# Patient Record
Sex: MALE | Race: WHITE | NOT HISPANIC OR LATINO | Employment: FULL TIME | URBAN - METROPOLITAN AREA
[De-identification: names, ages, dates, MRNs, and addresses within clinical notes are randomized per-mention and may not be internally consistent; named-entity substitution may affect disease eponyms.]

---

## 2023-07-20 LAB
BASOPHILS (10*3/UL) IN BLOOD BY AUTOMATED COUNT: 0.05 X10E9/L (ref 0–0.1)
BASOPHILS/100 LEUKOCYTES IN BLOOD BY AUTOMATED COUNT: 0.6 % (ref 0–2)
C REACTIVE PROTEIN (MG/L) IN SER/PLAS: 8.72 MG/DL
EOSINOPHILS (10*3/UL) IN BLOOD BY AUTOMATED COUNT: 0.2 X10E9/L (ref 0–0.7)
EOSINOPHILS/100 LEUKOCYTES IN BLOOD BY AUTOMATED COUNT: 2.3 % (ref 0–6)
ERYTHROCYTE DISTRIBUTION WIDTH (RATIO) BY AUTOMATED COUNT: 12.2 % (ref 11.5–14.5)
ERYTHROCYTE MEAN CORPUSCULAR HEMOGLOBIN CONCENTRATION (G/DL) BY AUTOMATED: 33.3 G/DL (ref 32–36)
ERYTHROCYTE MEAN CORPUSCULAR VOLUME (FL) BY AUTOMATED COUNT: 97 FL (ref 80–100)
ERYTHROCYTES (10*6/UL) IN BLOOD BY AUTOMATED COUNT: 4.37 X10E12/L (ref 4.5–5.9)
HEMATOCRIT (%) IN BLOOD BY AUTOMATED COUNT: 42.3 % (ref 41–52)
HEMOGLOBIN (G/DL) IN BLOOD: 14.1 G/DL (ref 13.5–17.5)
IMMATURE GRANULOCYTES/100 LEUKOCYTES IN BLOOD BY AUTOMATED COUNT: 0.6 % (ref 0–0.9)
LEUKOCYTES (10*3/UL) IN BLOOD BY AUTOMATED COUNT: 8.9 X10E9/L (ref 4.4–11.3)
LYMPHOCYTES (10*3/UL) IN BLOOD BY AUTOMATED COUNT: 1.1 X10E9/L (ref 1.2–4.8)
LYMPHOCYTES/100 LEUKOCYTES IN BLOOD BY AUTOMATED COUNT: 12.4 % (ref 13–44)
MONOCYTES (10*3/UL) IN BLOOD BY AUTOMATED COUNT: 0.76 X10E9/L (ref 0.1–1)
MONOCYTES/100 LEUKOCYTES IN BLOOD BY AUTOMATED COUNT: 8.6 % (ref 2–10)
NEUTROPHILS (10*3/UL) IN BLOOD BY AUTOMATED COUNT: 6.7 X10E9/L (ref 1.2–7.7)
NEUTROPHILS/100 LEUKOCYTES IN BLOOD BY AUTOMATED COUNT: 75.5 % (ref 40–80)
PLATELETS (10*3/UL) IN BLOOD AUTOMATED COUNT: 300 X10E9/L (ref 150–450)
SEDIMENTATION RATE, ERYTHROCYTE: 60 MM/H (ref 0–20)
URATE (MG/DL) IN SER/PLAS: 5.6 MG/DL (ref 4–7.5)

## 2023-07-21 LAB
ANTI-NUCLEAR ANTIBODY (ANA): NEGATIVE
RHEUMATOID FACTOR (IU/ML) IN SERUM OR PLASMA: >600 IU/ML (ref 0–15)

## 2023-07-26 ENCOUNTER — OFFICE VISIT (OUTPATIENT)
Dept: PRIMARY CARE | Facility: CLINIC | Age: 53
End: 2023-07-26
Payer: COMMERCIAL

## 2023-07-26 VITALS
HEIGHT: 73 IN | TEMPERATURE: 97.5 F | DIASTOLIC BLOOD PRESSURE: 76 MMHG | OXYGEN SATURATION: 95 % | RESPIRATION RATE: 17 BRPM | SYSTOLIC BLOOD PRESSURE: 112 MMHG | BODY MASS INDEX: 23.03 KG/M2 | HEART RATE: 93 BPM | WEIGHT: 173.8 LBS

## 2023-07-26 DIAGNOSIS — Z11.1 SCREENING EXAMINATION FOR PULMONARY TUBERCULOSIS: ICD-10-CM

## 2023-07-26 DIAGNOSIS — Z11.4 SCREENING FOR HIV (HUMAN IMMUNODEFICIENCY VIRUS): ICD-10-CM

## 2023-07-26 DIAGNOSIS — F17.210 CIGARETTE SMOKER: ICD-10-CM

## 2023-07-26 DIAGNOSIS — Z12.11 SCREEN FOR COLON CANCER: ICD-10-CM

## 2023-07-26 DIAGNOSIS — Z11.59 ENCOUNTER FOR HEPATITIS C SCREENING TEST FOR LOW RISK PATIENT: ICD-10-CM

## 2023-07-26 DIAGNOSIS — Z11.59 NEED FOR HEPATITIS B SCREENING TEST: ICD-10-CM

## 2023-07-26 DIAGNOSIS — Z13.1 SCREENING FOR DIABETES MELLITUS: ICD-10-CM

## 2023-07-26 DIAGNOSIS — Z13.220 SCREENING, LIPID: ICD-10-CM

## 2023-07-26 DIAGNOSIS — M06.4 INFLAMMATORY POLYARTHRITIS (MULTI): Primary | ICD-10-CM

## 2023-07-26 PROCEDURE — 4004F PT TOBACCO SCREEN RCVD TLK: CPT | Performed by: FAMILY MEDICINE

## 2023-07-26 PROCEDURE — 99203 OFFICE O/P NEW LOW 30 MIN: CPT | Performed by: FAMILY MEDICINE

## 2023-07-26 RX ORDER — PREDNISONE 10 MG/1
TABLET ORAL
COMMUNITY
Start: 2023-07-26 | End: 2023-08-10 | Stop reason: ALTCHOICE

## 2023-07-26 ASSESSMENT — ENCOUNTER SYMPTOMS
ARTHRALGIAS: 1
WHEEZING: 0
TROUBLE SWALLOWING: 0
HEADACHES: 0
DYSPHORIC MOOD: 0
SHORTNESS OF BREATH: 0
FATIGUE: 1
OCCASIONAL FEELINGS OF UNSTEADINESS: 1
POLYDIPSIA: 1
DIZZINESS: 0
MYALGIAS: 0
SLEEP DISTURBANCE: 1
BACK PAIN: 1
NERVOUS/ANXIOUS: 0
DIARRHEA: 0
NAUSEA: 0
DIFFICULTY URINATING: 0
CONSTIPATION: 0
PALPITATIONS: 0
LOSS OF SENSATION IN FEET: 0
DEPRESSION: 0
POLYPHAGIA: 0
UNEXPECTED WEIGHT CHANGE: 0
APPETITE CHANGE: 0

## 2023-07-26 ASSESSMENT — PATIENT HEALTH QUESTIONNAIRE - PHQ9
SUM OF ALL RESPONSES TO PHQ9 QUESTIONS 1 AND 2: 0
1. LITTLE INTEREST OR PLEASURE IN DOING THINGS: NOT AT ALL
2. FEELING DOWN, DEPRESSED OR HOPELESS: NOT AT ALL

## 2023-07-26 ASSESSMENT — COLUMBIA-SUICIDE SEVERITY RATING SCALE - C-SSRS
6. HAVE YOU EVER DONE ANYTHING, STARTED TO DO ANYTHING, OR PREPARED TO DO ANYTHING TO END YOUR LIFE?: NO
2. HAVE YOU ACTUALLY HAD ANY THOUGHTS OF KILLING YOURSELF?: NO
1. IN THE PAST MONTH, HAVE YOU WISHED YOU WERE DEAD OR WISHED YOU COULD GO TO SLEEP AND NOT WAKE UP?: NO

## 2023-07-26 NOTE — PROGRESS NOTES
Subjective   Patient ID: Cleve Fowler is a 53 y.o. male who presents for Establish Care.    New patient.     Has had 4 weeks of knee pain, hard to walk and move. Feet and ankles are swollen too. Thumbs are swollen, right greater than left. He saw his ortho Dr. Mora who sent him to Rheumatology. He was seen today and labs were reviewed. He had labs done with elevated CRP, Sed Rate of 60, FR>600. Told him maybe RA but did chest xray and more labs. Started Prednisone today. He has never been on prednisone before.     No other concerns today. Works as  , stands all shift.            Current Outpatient Medications:     predniSONE (Deltasone) 10 mg tablet, Take by mouth., Disp: , Rfl:     Patient Active Problem List   Diagnosis    Inflammatory polyarthritis (CMS/[facility])    Cigarette smoker         Review of Systems   Constitutional:  Positive for fatigue. Negative for appetite change and unexpected weight change.   HENT:  Negative for dental problem, hearing loss and trouble swallowing.    Eyes:  Negative for visual disturbance.   Respiratory:  Negative for shortness of breath and wheezing.    Cardiovascular:  Positive for leg swelling. Negative for chest pain and palpitations.   Gastrointestinal:  Negative for constipation, diarrhea and nausea.   Endocrine: Positive for polydipsia. Negative for cold intolerance, heat intolerance, polyphagia and polyuria.   Genitourinary:  Negative for difficulty urinating.   Musculoskeletal:  Positive for arthralgias and back pain. Negative for myalgias.        Shoulders, elbows, difficulty lifting up arm to schratch head at time.Wrist sores.    Skin:  Negative for rash.   Neurological:  Negative for dizziness and headaches.   Psychiatric/Behavioral:  Positive for sleep disturbance. Negative for dysphoric mood. The patient is not nervous/anxious.         Snoring, wakes self up, wife says he stops breathign.        Objective   /76 (BP Location: Right arm, Patient  "Position: Sitting, BP Cuff Size: Adult)   Pulse 93   Temp 36.4 °C (97.5 °F)   Resp 17   Ht 1.854 m (6' 1\")   Wt 78.8 kg (173 lb 12.8 oz)   SpO2 95%   BMI 22.93 kg/m²     Physical Exam  Vitals reviewed.   Constitutional:       General: He is not in acute distress.     Appearance: He is not ill-appearing.   Neck:      Vascular: No carotid bruit.   Cardiovascular:      Rate and Rhythm: Normal rate and regular rhythm.      Pulses: Normal pulses.      Heart sounds: No murmur heard.  Pulmonary:      Effort: Pulmonary effort is normal.      Breath sounds: Normal breath sounds.   Musculoskeletal:      Left lower leg: No edema.   Skin:     Findings: No rash.   Neurological:      Mental Status: He is alert.   Psychiatric:         Mood and Affect: Mood normal.       Recent Results (from the past 1008 hour(s))   Sedimentation Rate    Collection Time: 07/20/23  3:30 PM   Result Value Ref Range    Sedimentation Rate 60 (H) 0 - 20 mm/h   VAMSI with Reflex to BRADEN    Collection Time: 07/20/23  3:30 PM   Result Value Ref Range    ANTI-NUCLEAR ANTIBODY (VAMSI) NEGATIVE NEGATIVE   C-Reactive Protein    Collection Time: 07/20/23  3:30 PM   Result Value Ref Range    CRP 8.72 (A) mg/dL   Rheumatoid Factor    Collection Time: 07/20/23  3:30 PM   Result Value Ref Range    Rheumatoid Factor >600 (H) 0 - 15 IU/mL   Uric Acid    Collection Time: 07/20/23  3:30 PM   Result Value Ref Range    Uric Acid 5.6 4.0 - 7.5 mg/dL   CBC and Auto Differential    Collection Time: 07/20/23  3:30 PM   Result Value Ref Range    WBC 8.9 4.4 - 11.3 x10E9/L    RBC 4.37 (L) 4.50 - 5.90 x10E12/L    Hemoglobin 14.1 13.5 - 17.5 g/dL    Hematocrit 42.3 41.0 - 52.0 %    MCV 97 80 - 100 fL    MCHC 33.3 32.0 - 36.0 g/dL    Platelets 300 150 - 450 x10E9/L    RDW 12.2 11.5 - 14.5 %    Neutrophils % 75.5 40.0 - 80.0 %    Immature Granulocytes %, Automated 0.6 0.0 - 0.9 %    Lymphocytes % 12.4 13.0 - 44.0 %    Monocytes % 8.6 2.0 - 10.0 %    Eosinophils % 2.3 0.0 - 6.0 % "    Basophils % 0.6 0.0 - 2.0 %    Neutrophils Absolute 6.70 1.20 - 7.70 x10E9/L    Lymphocytes Absolute 1.10 (L) 1.20 - 4.80 x10E9/L    Monocytes Absolute 0.76 0.10 - 1.00 x10E9/L    Eosinophils Absolute 0.20 0.00 - 0.70 x10E9/L    Basophils Absolute 0.05 0.00 - 0.10 x10E9/L           Assessment/Plan   Problem List Items Addressed This Visit       Inflammatory polyarthritis (CMS/HCC) - Primary     Discussed lab results, He is likely to go on a DMARD. Will get labs done. Given short term prednisone burst while await rheumatology.          Relevant Orders    Comprehensive Metabolic Panel    Cigarette smoker     Discussed quitting. Offered LDCT and he accepted.          Relevant Orders    CT lung screening low dose     Other Visit Diagnoses       Encounter for hepatitis C screening test for low risk patient        Relevant Orders    Hepatitis C Antibody    Screening for HIV (human immunodeficiency virus)        Relevant Orders    HIV 1/2 Antigen/Antibody Screen with Reflex to Confirmation    Screening, lipid        Relevant Orders    Lipid Panel    Screening for diabetes mellitus        Relevant Orders    Comprehensive Metabolic Panel    Hemoglobin A1C    Screening examination for pulmonary tuberculosis        Relevant Orders    T-Spot TB    Need for hepatitis B screening test        Relevant Orders    Hepatitis B Surface Antibody    Hepatitis B Surface Antigen    Hepatitis B core antibody, total    Screen for colon cancer        Relevant Orders    Colonoscopy              Assessment, plans, tests, and follow up discussed with patient and patient verbalized understanding. Cleve was given an opportunity to ask questions and  any concerns were addressed including but not limited to medication, follow up, indications to call, side effects and use of Prednisone. .

## 2023-07-28 PROBLEM — M06.4 INFLAMMATORY POLYARTHRITIS (MULTI): Status: ACTIVE | Noted: 2023-07-28

## 2023-07-28 PROBLEM — F17.210 CIGARETTE SMOKER: Status: ACTIVE | Noted: 2023-07-28

## 2023-07-28 NOTE — ASSESSMENT & PLAN NOTE
Discussed lab results, He is likely to go on a DMARD. Will get labs done. Given short term prednisone burst while await rheumatology.

## 2023-08-10 ENCOUNTER — OFFICE VISIT (OUTPATIENT)
Dept: PRIMARY CARE | Facility: CLINIC | Age: 53
End: 2023-08-10
Payer: COMMERCIAL

## 2023-08-10 ENCOUNTER — LAB (OUTPATIENT)
Dept: LAB | Facility: LAB | Age: 53
End: 2023-08-10
Payer: COMMERCIAL

## 2023-08-10 VITALS
OXYGEN SATURATION: 93 % | WEIGHT: 181.4 LBS | SYSTOLIC BLOOD PRESSURE: 153 MMHG | BODY MASS INDEX: 24.04 KG/M2 | TEMPERATURE: 97.8 F | HEART RATE: 98 BPM | DIASTOLIC BLOOD PRESSURE: 95 MMHG | HEIGHT: 73 IN

## 2023-08-10 DIAGNOSIS — M79.89 SWELLING OF LOWER EXTREMITY: ICD-10-CM

## 2023-08-10 DIAGNOSIS — F17.210 CIGARETTE SMOKER: ICD-10-CM

## 2023-08-10 DIAGNOSIS — Z11.59 ENCOUNTER FOR HEPATITIS C SCREENING TEST FOR LOW RISK PATIENT: ICD-10-CM

## 2023-08-10 DIAGNOSIS — Z00.00 WELL ADULT EXAM: Primary | ICD-10-CM

## 2023-08-10 DIAGNOSIS — Z11.59 NEED FOR HEPATITIS B SCREENING TEST: ICD-10-CM

## 2023-08-10 DIAGNOSIS — Z11.1 SCREENING EXAMINATION FOR PULMONARY TUBERCULOSIS: ICD-10-CM

## 2023-08-10 DIAGNOSIS — M06.4 INFLAMMATORY POLYARTHRITIS (MULTI): ICD-10-CM

## 2023-08-10 DIAGNOSIS — Z13.1 SCREENING FOR DIABETES MELLITUS: ICD-10-CM

## 2023-08-10 DIAGNOSIS — Z13.220 SCREENING, LIPID: ICD-10-CM

## 2023-08-10 DIAGNOSIS — Z11.4 SCREENING FOR HIV (HUMAN IMMUNODEFICIENCY VIRUS): ICD-10-CM

## 2023-08-10 LAB
ALANINE AMINOTRANSFERASE (SGPT) (U/L) IN SER/PLAS: 19 U/L (ref 10–52)
ALBUMIN (G/DL) IN SER/PLAS: 3.5 G/DL (ref 3.4–5)
ALKALINE PHOSPHATASE (U/L) IN SER/PLAS: 60 U/L (ref 33–120)
ANION GAP IN SER/PLAS: 9 MMOL/L (ref 10–20)
ASPARTATE AMINOTRANSFERASE (SGOT) (U/L) IN SER/PLAS: 15 U/L (ref 9–39)
BILIRUBIN TOTAL (MG/DL) IN SER/PLAS: 0.5 MG/DL (ref 0–1.2)
CALCIUM (MG/DL) IN SER/PLAS: 9.3 MG/DL (ref 8.6–10.3)
CARBON DIOXIDE, TOTAL (MMOL/L) IN SER/PLAS: 30 MMOL/L (ref 21–32)
CHLORIDE (MMOL/L) IN SER/PLAS: 104 MMOL/L (ref 98–107)
CHOLESTEROL (MG/DL) IN SER/PLAS: 207 MG/DL (ref 0–199)
CHOLESTEROL IN HDL (MG/DL) IN SER/PLAS: 96.1 MG/DL
CHOLESTEROL/HDL RATIO: 2.2
CREATININE (MG/DL) IN SER/PLAS: 0.74 MG/DL (ref 0.5–1.3)
ESTIMATED AVERAGE GLUCOSE FOR HBA1C: 123 MG/DL
GFR MALE: >90 ML/MIN/1.73M2
GLUCOSE (MG/DL) IN SER/PLAS: 83 MG/DL (ref 74–99)
HEMOGLOBIN A1C/HEMOGLOBIN TOTAL IN BLOOD: 5.9 %
HEPATITIS B VIRUS CORE AB (PRESENCE) IN SER/PLAS BY IMM: NONREACTIVE
HEPATITIS B VIRUS SURFACE AB (MIU/ML) IN SERUM: <3.1 MIU/ML
HEPATITIS B VIRUS SURFACE AG PRESENCE IN SERUM: NONREACTIVE
HEPATITIS C VIRUS AB PRESENCE IN SERUM: NONREACTIVE
HIV 1/ 2 AG/AB SCREEN: NONREACTIVE
LDL: 99 MG/DL (ref 0–99)
POTASSIUM (MMOL/L) IN SER/PLAS: 4.3 MMOL/L (ref 3.5–5.3)
PROTEIN TOTAL: 6.4 G/DL (ref 6.4–8.2)
SODIUM (MMOL/L) IN SER/PLAS: 139 MMOL/L (ref 136–145)
TRIGLYCERIDE (MG/DL) IN SER/PLAS: 58 MG/DL (ref 0–149)
UREA NITROGEN (MG/DL) IN SER/PLAS: 12 MG/DL (ref 6–23)
VLDL: 12 MG/DL (ref 0–40)

## 2023-08-10 PROCEDURE — 87340 HEPATITIS B SURFACE AG IA: CPT

## 2023-08-10 PROCEDURE — 86803 HEPATITIS C AB TEST: CPT

## 2023-08-10 PROCEDURE — 86704 HEP B CORE ANTIBODY TOTAL: CPT

## 2023-08-10 PROCEDURE — 80061 LIPID PANEL: CPT

## 2023-08-10 PROCEDURE — 87389 HIV-1 AG W/HIV-1&-2 AB AG IA: CPT

## 2023-08-10 PROCEDURE — 36415 COLL VENOUS BLD VENIPUNCTURE: CPT

## 2023-08-10 PROCEDURE — 80053 COMPREHEN METABOLIC PANEL: CPT

## 2023-08-10 PROCEDURE — 4004F PT TOBACCO SCREEN RCVD TLK: CPT | Performed by: FAMILY MEDICINE

## 2023-08-10 PROCEDURE — 86706 HEP B SURFACE ANTIBODY: CPT

## 2023-08-10 PROCEDURE — 86481 TB AG RESPONSE T-CELL SUSP: CPT

## 2023-08-10 PROCEDURE — 99396 PREV VISIT EST AGE 40-64: CPT | Performed by: FAMILY MEDICINE

## 2023-08-10 PROCEDURE — 83036 HEMOGLOBIN GLYCOSYLATED A1C: CPT

## 2023-08-10 RX ORDER — HYDROCHLOROTHIAZIDE 25 MG/1
25 TABLET ORAL DAILY PRN
Qty: 30 TABLET | Refills: 0 | Status: SHIPPED | OUTPATIENT
Start: 2023-08-10 | End: 2023-08-30 | Stop reason: SDUPTHER

## 2023-08-10 RX ORDER — ETANERCEPT 50 MG/ML
50 SOLUTION SUBCUTANEOUS WEEKLY
Qty: 4 ML | Refills: 5 | COMMUNITY
Start: 2023-08-01 | End: 2024-01-28

## 2023-08-10 ASSESSMENT — ENCOUNTER SYMPTOMS
DECREASED CONCENTRATION: 0
DEPRESSION: 0
SEIZURES: 0
VOMITING: 0
ARTHRALGIAS: 0
DIFFICULTY URINATING: 0
DYSPHORIC MOOD: 0
DIARRHEA: 0
NAUSEA: 0
NERVOUS/ANXIOUS: 0
DIZZINESS: 0
FREQUENCY: 0
SHORTNESS OF BREATH: 0
COUGH: 0
ABDOMINAL PAIN: 0
ACTIVITY CHANGE: 0
LOSS OF SENSATION IN FEET: 0
POLYDIPSIA: 0
FATIGUE: 0
POLYPHAGIA: 0
HEADACHES: 0
JOINT SWELLING: 0
TROUBLE SWALLOWING: 0
CONSTIPATION: 0
APPETITE CHANGE: 0
UNEXPECTED WEIGHT CHANGE: 0
OCCASIONAL FEELINGS OF UNSTEADINESS: 0
PALPITATIONS: 0
WHEEZING: 0
BLOOD IN STOOL: 0

## 2023-08-10 NOTE — ASSESSMENT & PLAN NOTE
Reviewed Rheum notes. He is to have HIV, TB and Hep C drawn. Ordered last visit. He will follow up with them next month.

## 2023-08-13 LAB
NIL(NEG) CONTROL SPOT COUNT: NORMAL
PANEL A SPOT COUNT: 0
PANEL B SPOT COUNT: 0
POS CONTROL SPOT COUNT: NORMAL
T-SPOT. TB INTERPRETATION: NEGATIVE

## 2023-08-14 ENCOUNTER — TELEPHONE (OUTPATIENT)
Dept: PRIMARY CARE | Facility: CLINIC | Age: 53
End: 2023-08-14
Payer: COMMERCIAL

## 2023-08-14 NOTE — TELEPHONE ENCOUNTER
Please notify patint TB test is negative (TSPot) and see if needs it sent to his rheumatologist or dermatologist, whomever is treating his polyarthtirits

## 2023-08-16 ENCOUNTER — TELEPHONE (OUTPATIENT)
Dept: PRIMARY CARE | Facility: CLINIC | Age: 53
End: 2023-08-16
Payer: COMMERCIAL

## 2023-08-16 NOTE — TELEPHONE ENCOUNTER
Patient called in and wanted to know if you wanted him to start the enbrel sureclick now, or if he should wait until he is officially done with his steroids.

## 2023-08-30 ENCOUNTER — OFFICE VISIT (OUTPATIENT)
Dept: PRIMARY CARE | Facility: CLINIC | Age: 53
End: 2023-08-30
Payer: COMMERCIAL

## 2023-08-30 VITALS
HEIGHT: 73 IN | BODY MASS INDEX: 24.01 KG/M2 | OXYGEN SATURATION: 91 % | DIASTOLIC BLOOD PRESSURE: 76 MMHG | TEMPERATURE: 97.6 F | RESPIRATION RATE: 17 BRPM | WEIGHT: 181.2 LBS | HEART RATE: 90 BPM | SYSTOLIC BLOOD PRESSURE: 115 MMHG

## 2023-08-30 DIAGNOSIS — G47.30 SLEEP APNEA, UNSPECIFIED TYPE: ICD-10-CM

## 2023-08-30 DIAGNOSIS — M06.4 INFLAMMATORY POLYARTHRITIS (MULTI): ICD-10-CM

## 2023-08-30 DIAGNOSIS — Z23 ENCOUNTER FOR IMMUNIZATION: ICD-10-CM

## 2023-08-30 DIAGNOSIS — F17.210 CIGARETTE SMOKER: ICD-10-CM

## 2023-08-30 DIAGNOSIS — M79.89 SWELLING OF LOWER EXTREMITY: Primary | ICD-10-CM

## 2023-08-30 DIAGNOSIS — R40.0 DAYTIME SOMNOLENCE: ICD-10-CM

## 2023-08-30 PROCEDURE — 4004F PT TOBACCO SCREEN RCVD TLK: CPT | Performed by: FAMILY MEDICINE

## 2023-08-30 PROCEDURE — 90677 PCV20 VACCINE IM: CPT | Performed by: FAMILY MEDICINE

## 2023-08-30 PROCEDURE — 90471 IMMUNIZATION ADMIN: CPT | Performed by: FAMILY MEDICINE

## 2023-08-30 PROCEDURE — 99214 OFFICE O/P EST MOD 30 MIN: CPT | Performed by: FAMILY MEDICINE

## 2023-08-30 RX ORDER — PREDNISONE 10 MG/1
TABLET ORAL
COMMUNITY
Start: 2023-08-11 | End: 2023-12-07 | Stop reason: DRUGHIGH

## 2023-08-30 RX ORDER — HYDROCHLOROTHIAZIDE 25 MG/1
25 TABLET ORAL DAILY PRN
Qty: 90 TABLET | Refills: 1 | Status: SHIPPED | OUTPATIENT
Start: 2023-08-30 | End: 2024-01-17 | Stop reason: ALTCHOICE

## 2023-08-30 RX ORDER — TRAZODONE HYDROCHLORIDE 50 MG/1
50 TABLET ORAL NIGHTLY
COMMUNITY
Start: 2023-08-25

## 2023-08-30 ASSESSMENT — ENCOUNTER SYMPTOMS
CONFUSION: 0
UNEXPECTED WEIGHT CHANGE: 0
DEPRESSION: 0
LOSS OF SENSATION IN FEET: 0
SHORTNESS OF BREATH: 0
HEADACHES: 0
COUGH: 0
PALPITATIONS: 0
JOINT SWELLING: 0
OCCASIONAL FEELINGS OF UNSTEADINESS: 0
WHEEZING: 0

## 2023-08-30 ASSESSMENT — COLUMBIA-SUICIDE SEVERITY RATING SCALE - C-SSRS
6. HAVE YOU EVER DONE ANYTHING, STARTED TO DO ANYTHING, OR PREPARED TO DO ANYTHING TO END YOUR LIFE?: NO
1. IN THE PAST MONTH, HAVE YOU WISHED YOU WERE DEAD OR WISHED YOU COULD GO TO SLEEP AND NOT WAKE UP?: NO
2. HAVE YOU ACTUALLY HAD ANY THOUGHTS OF KILLING YOURSELF?: NO

## 2023-08-30 ASSESSMENT — PATIENT HEALTH QUESTIONNAIRE - PHQ9
2. FEELING DOWN, DEPRESSED OR HOPELESS: NOT AT ALL
1. LITTLE INTEREST OR PLEASURE IN DOING THINGS: NOT AT ALL
SUM OF ALL RESPONSES TO PHQ9 QUESTIONS 1 AND 2: 0

## 2023-08-30 NOTE — PATIENT INSTRUCTIONS
You had Prevnar 20 shot today, this is for pneumonia    Need Flu and COVID shots in October or November.   Due for Tdap and Shingrix and will get them done over course of regular visits.     Follow up in 3 months with labs first.

## 2023-08-30 NOTE — PROGRESS NOTES
Subjective   Patient ID: Cleve Fowler is a 53 y.o. male who presents for Follow-up.    Here for follow up.     Seen on 8/10/2023.     Swelling - given hydrochlorothiazide. He is doing much better on it. Feet still get sore after walking or standing too long. Has orthopedic insoles. Feet still get sore with standing for very long. He has used the same insoles for 5-6 yrs.     BP was elevated last visit. On Prednisone, tapered down to 2 pills daily. On Enbrel and he has not been able to get it. He got notice from insurance that not covered. Has not contacted insurance.     Smoking is slowing down. Trying to quit.     Not sleeping well. Is on prednisone but preceded this. Per wife, stops breathing in the night. Needs referral for sleep study.     Due for Tdap, pneumonia and shingrix. Willing to start with pneumonia vaccines.            Current Outpatient Medications:     hydroCHLOROthiazide (HYDRODiuril) 25 mg tablet, Take 1 tablet (25 mg) by mouth once daily as needed (swelling legs)., Disp: 30 tablet, Rfl: 0    predniSONE (Deltasone) 10 mg tablet, PLEASE SEE ATTACHED FOR DETAILED DIRECTIONS, Disp: , Rfl:     traZODone (Desyrel) 50 mg tablet, Take 1 tablet (50 mg) by mouth once daily at bedtime., Disp: , Rfl:     etanercept (EnbreL SureClick) 50 mg/mL (1 mL) pen injector pen injection, Inject 1 mL (50 mg) under the skin once a week., Disp: 4 mL, Rfl: 5    Patient Active Problem List   Diagnosis    Inflammatory polyarthritis (CMS/HCC)    Cigarette smoker    Swelling of lower extremity    Sleep apnea    Daytime somnolence         Review of Systems   Constitutional:  Negative for unexpected weight change.   Respiratory:  Negative for cough, shortness of breath and wheezing.    Cardiovascular:  Negative for chest pain, palpitations and leg swelling.   Musculoskeletal:  Negative for joint swelling.   Skin:  Negative for rash.   Neurological:  Negative for headaches.   Psychiatric/Behavioral:  Negative for confusion.   "      Objective   /76 (BP Location: Left arm, Patient Position: Sitting, BP Cuff Size: Adult)   Pulse 90   Temp 36.4 °C (97.6 °F)   Resp 17   Ht 1.842 m (6' 0.5\")   Wt 82.2 kg (181 lb 3.2 oz)   SpO2 91%   BMI 24.24 kg/m²     Physical Exam  Vitals reviewed.   Constitutional:       Appearance: Normal appearance.   Pulmonary:      Effort: Pulmonary effort is normal.   Musculoskeletal:      Comments: Trace swelling both ankles.    Neurological:      Mental Status: He is alert.   Psychiatric:         Mood and Affect: Mood normal.         Behavior: Behavior normal.       Recent Results (from the past 1008 hour(s))   Sedimentation Rate    Collection Time: 07/20/23  3:30 PM   Result Value Ref Range    Sedimentation Rate 60 (H) 0 - 20 mm/h   VAMSI with Reflex to BRADEN    Collection Time: 07/20/23  3:30 PM   Result Value Ref Range    ANTI-NUCLEAR ANTIBODY (VAMSI) NEGATIVE NEGATIVE   C-Reactive Protein    Collection Time: 07/20/23  3:30 PM   Result Value Ref Range    CRP 8.72 (A) mg/dL   Rheumatoid Factor    Collection Time: 07/20/23  3:30 PM   Result Value Ref Range    Rheumatoid Factor >600 (H) 0 - 15 IU/mL   Uric Acid    Collection Time: 07/20/23  3:30 PM   Result Value Ref Range    Uric Acid 5.6 4.0 - 7.5 mg/dL   CBC and Auto Differential    Collection Time: 07/20/23  3:30 PM   Result Value Ref Range    WBC 8.9 4.4 - 11.3 x10E9/L    RBC 4.37 (L) 4.50 - 5.90 x10E12/L    Hemoglobin 14.1 13.5 - 17.5 g/dL    Hematocrit 42.3 41.0 - 52.0 %    MCV 97 80 - 100 fL    MCHC 33.3 32.0 - 36.0 g/dL    Platelets 300 150 - 450 x10E9/L    RDW 12.2 11.5 - 14.5 %    Neutrophils % 75.5 40.0 - 80.0 %    Immature Granulocytes %, Automated 0.6 0.0 - 0.9 %    Lymphocytes % 12.4 13.0 - 44.0 %    Monocytes % 8.6 2.0 - 10.0 %    Eosinophils % 2.3 0.0 - 6.0 %    Basophils % 0.6 0.0 - 2.0 %    Neutrophils Absolute 6.70 1.20 - 7.70 x10E9/L    Lymphocytes Absolute 1.10 (L) 1.20 - 4.80 x10E9/L    Monocytes Absolute 0.76 0.10 - 1.00 x10E9/L    " Eosinophils Absolute 0.20 0.00 - 0.70 x10E9/L    Basophils Absolute 0.05 0.00 - 0.10 x10E9/L   Comprehensive Metabolic Panel    Collection Time: 08/10/23 10:53 AM   Result Value Ref Range    Glucose 83 74 - 99 mg/dL    Sodium 139 136 - 145 mmol/L    Potassium 4.3 3.5 - 5.3 mmol/L    Chloride 104 98 - 107 mmol/L    Bicarbonate 30 21 - 32 mmol/L    Anion Gap 9 (L) 10 - 20 mmol/L    Urea Nitrogen 12 6 - 23 mg/dL    Creatinine 0.74 0.50 - 1.30 mg/dL    GFR MALE >90 >90 mL/min/1.73m2    Calcium 9.3 8.6 - 10.3 mg/dL    Albumin 3.5 3.4 - 5.0 g/dL    Alkaline Phosphatase 60 33 - 120 U/L    Total Protein 6.4 6.4 - 8.2 g/dL    AST 15 9 - 39 U/L    Total Bilirubin 0.5 0.0 - 1.2 mg/dL    ALT (SGPT) 19 10 - 52 U/L   Hemoglobin A1C    Collection Time: 08/10/23 10:53 AM   Result Value Ref Range    Hemoglobin A1C 5.9 (A) %    Estimated Average Glucose 123 MG/DL   Hepatitis C Antibody    Collection Time: 08/10/23 10:53 AM   Result Value Ref Range    Hepatitis C Ab NONREACTIVE NONREACTIVE   Lipid Panel    Collection Time: 08/10/23 10:53 AM   Result Value Ref Range    Cholesterol 207 (H) 0 - 199 mg/dL    HDL 96.1 mg/dL    Cholesterol/HDL Ratio 2.2     LDL 99 0 - 99 mg/dL    VLDL 12 0 - 40 mg/dL    Triglycerides 58 0 - 149 mg/dL   Hepatitis B Surface Antibody    Collection Time: 08/10/23 10:53 AM   Result Value Ref Range    Hepatitis B Surface Ab <3.1 <10 mIU/mL   Hepatitis B Surface Antigen    Collection Time: 08/10/23 10:53 AM   Result Value Ref Range    Hepatitis B Surface Ag NONREACTIVE NONREACTIVE   Hepatitis B core antibody, total    Collection Time: 08/10/23 10:53 AM   Result Value Ref Range    Hepatitis B Core Total Ab NONREACTIVE NONREACTIVE   T-Spot TB    Collection Time: 08/10/23 10:53 AM   Result Value Ref Range    T-SPOT. TB Interpretation Negative Normal Value: Negative    Panel A Spot Count 0     Panel B Spot Count 0     NIL(NEG) Control Spot Count Passed     POS Control Spot Count Passed    HIV 1/2 Antigen/Antibody  Screen with Reflex to Confirmation    Collection Time: 08/10/23 10:53 AM   Result Value Ref Range    HIV 1 and 2 Screen NONREACTIVE NONREACTIVE         Assessment/Plan   Problem List Items Addressed This Visit       Inflammatory polyarthritis (CMS/HCC)     He is awaiting DMARD approval, tapering off Prednisone.          Cigarette smoker     Cut back a lot, continuing to work toward cessation         Swelling of lower extremity - Primary     Improving a lot. Add compression socks. Follow up in 3 months, labs first. Continue hydrochlorothiazide.          Relevant Orders    Basic Metabolic Panel    Sleep apnea     Observed by wife. Sleep study ordered.          Relevant Orders    In-Center Sleep Study (Non-Sleep Provider Only)    Daytime somnolence     Suspecct LUIS. Sleep study. Follow up in couple months.          Relevant Orders    In-Center Sleep Study (Non-Sleep Provider Only)     Other Visit Diagnoses       Encounter for immunization        Relevant Orders    Pneumococcal conjugate vaccine, 20-valent, adult (PREVNAR 20)              Assessment, plans, tests, and follow up discussed with patient and patient verbalized understanding. Cleve was given an opportunity to ask questions and  any concerns were addressed including but not limited to medications, testing follow up. .

## 2023-08-30 NOTE — ASSESSMENT & PLAN NOTE
Improving a lot. Add compression socks. Follow up in 3 months, labs first. Continue hydrochlorothiazide.

## 2023-09-11 ENCOUNTER — APPOINTMENT (OUTPATIENT)
Dept: PRIMARY CARE | Facility: CLINIC | Age: 53
End: 2023-09-11
Payer: COMMERCIAL

## 2023-09-20 ENCOUNTER — HOSPITAL ENCOUNTER (OUTPATIENT)
Dept: DATA CONVERSION | Facility: HOSPITAL | Age: 53
End: 2023-09-20
Attending: INTERNAL MEDICINE | Admitting: INTERNAL MEDICINE
Payer: COMMERCIAL

## 2023-09-20 DIAGNOSIS — Z12.11 ENCOUNTER FOR SCREENING FOR MALIGNANT NEOPLASM OF COLON: ICD-10-CM

## 2023-09-20 DIAGNOSIS — F17.200 NICOTINE DEPENDENCE, UNSPECIFIED, UNCOMPLICATED: ICD-10-CM

## 2023-09-20 DIAGNOSIS — K64.9 UNSPECIFIED HEMORRHOIDS: ICD-10-CM

## 2023-09-20 DIAGNOSIS — I10 ESSENTIAL (PRIMARY) HYPERTENSION: ICD-10-CM

## 2023-09-20 DIAGNOSIS — D12.3 BENIGN NEOPLASM OF TRANSVERSE COLON: ICD-10-CM

## 2023-09-26 LAB
COMPLETE PATHOLOGY REPORT: NORMAL
CONVERTED CLINICAL DIAGNOSIS-HISTORY: NORMAL
CONVERTED FINAL DIAGNOSIS: NORMAL
CONVERTED FINAL REPORT PDF LINK TO COPY AND PASTE: NORMAL
CONVERTED GROSS DESCRIPTION: NORMAL

## 2023-09-29 VITALS — BODY MASS INDEX: 25.08 KG/M2 | WEIGHT: 185.19 LBS | HEIGHT: 72 IN

## 2023-10-21 ENCOUNTER — CLINICAL SUPPORT (OUTPATIENT)
Dept: SLEEP MEDICINE | Facility: CLINIC | Age: 53
End: 2023-10-21
Payer: COMMERCIAL

## 2023-10-21 VITALS
DIASTOLIC BLOOD PRESSURE: 76 MMHG | WEIGHT: 181.22 LBS | BODY MASS INDEX: 24.55 KG/M2 | SYSTOLIC BLOOD PRESSURE: 115 MMHG | HEIGHT: 72 IN

## 2023-10-21 DIAGNOSIS — G47.30 SLEEP APNEA, UNSPECIFIED TYPE: ICD-10-CM

## 2023-10-21 DIAGNOSIS — G47.33 OBSTRUCTIVE SLEEP APNEA (ADULT) (PEDIATRIC): ICD-10-CM

## 2023-10-21 DIAGNOSIS — R40.0 DAYTIME SOMNOLENCE: ICD-10-CM

## 2023-10-21 PROCEDURE — 95811 POLYSOM 6/>YRS CPAP 4/> PARM: CPT | Performed by: STUDENT IN AN ORGANIZED HEALTH CARE EDUCATION/TRAINING PROGRAM

## 2023-10-21 ASSESSMENT — SLEEP AND FATIGUE QUESTIONNAIRES
HOW LIKELY ARE YOU TO NOD OFF OR FALL ASLEEP WHILE SITTING AND TALKING TO SOMEONE: WOULD NEVER DOZE
SITING INACTIVE IN A PUBLIC PLACE LIKE A CLASS ROOM OR A MOVIE THEATER: MODERATE CHANCE OF DOZING
HOW LIKELY ARE YOU TO NOD OFF OR FALL ASLEEP WHILE LYING DOWN TO REST IN THE AFTERNOON WHEN CIRCUMSTANCES PERMIT: MODERATE CHANCE OF DOZING
ESS-CHAD TOTAL SCORE: 10
HOW LIKELY ARE YOU TO NOD OFF OR FALL ASLEEP IN A CAR, WHILE STOPPED FOR A FEW MINUTES IN TRAFFIC: WOULD NEVER DOZE
HOW LIKELY ARE YOU TO NOD OFF OR FALL ASLEEP WHILE SITTING AND READING: MODERATE CHANCE OF DOZING
HOW LIKELY ARE YOU TO NOD OFF OR FALL ASLEEP WHEN YOU ARE A PASSENGER IN A CAR FOR AN HOUR WITHOUT A BREAK: SLIGHT CHANCE OF DOZING
HOW LIKELY ARE YOU TO NOD OFF OR FALL ASLEEP WHILE WATCHING TV: MODERATE CHANCE OF DOZING
HOW LIKELY ARE YOU TO NOD OFF OR FALL ASLEEP WHILE SITTING QUIETLY AFTER LUNCH WITHOUT ALCOHOL: SLIGHT CHANCE OF DOZING

## 2023-10-22 NOTE — PROGRESS NOTES
[facility] TECH NOTE:     Patient: Cleve Fowler   MRN//AGE: [id number]  1970  53 y.o.   Technologist: Francine Kelly   Room: 3   Service Date: 10/21/2023        [facility]     TECHNOLOGIST SLEEP STUDY PROCEDURE NOTE:   This sleep study is being conducted according to the policies and procedures outlined by the AAS accreditation standards.  The sleep study procedure and processes involved during this appointment was explained to the patient/patient’s family, questions were answered. The patient/family verbalized understanding.      The patient is a 53 y.o. year old male scheduled for a Diagnostic PSG with montage of:  Standard . he arrived for his appointment.      The study that was ultimately completed was a Diagnostic PSG Split night with montage of:  Standard .    The full study Was completed.  Patient questionnaires completed?: yes     Consents signed? yes    Initial Fall Risk Screening:     Cleve has not fallen in the last 6 months. his did not result in injury. Cleve does not have a fear of falling. He does not need assistance with sitting, standing, or walking. he does not need assistance walking in his home. he does not need assistance in an unfamiliar setting. The patient is notusing an assistive device.     Brief Study observations: Frequent respiratory events occurred.  Loud snoring was heard.  The patient met the AHI criteria of 30, so CPAP was applied at 00:43am.  Pressures ranged from 4 cm to 12 cm. The patient tolerated the CPAP therapy well.  No PLM's.  No significant ECG arrhythmias were seen.    Deviation to order/protocol and reason: The pt. met the AHI criteria of 30 so a split night study was performed.      If PAP, which was preferred mask/pressure/mode: Res Med Airfit N-20 Large      Other:None    After the procedure, the patient/family was informed to ensure followup with ordering clinician for testing results.      Technologist: Francine  Robin, [email or web address]

## 2023-11-10 ENCOUNTER — TELEPHONE (OUTPATIENT)
Dept: PRIMARY CARE | Facility: CLINIC | Age: 53
End: 2023-11-10
Payer: COMMERCIAL

## 2023-11-10 DIAGNOSIS — G47.33 OBSTRUCTIVE SLEEP APNEA: Primary | ICD-10-CM

## 2023-11-10 NOTE — TELEPHONE ENCOUNTER
Sleep study from titration recommended AutoCPAP 4-14 cm H20 with Heated Humidification, and EPR/Flex of 3. Recommended Airfit N20 mask large. I have put in order and printed it. Please find out what supplier he will use and fax it over.

## 2023-12-06 ENCOUNTER — APPOINTMENT (OUTPATIENT)
Dept: PRIMARY CARE | Facility: CLINIC | Age: 53
End: 2023-12-06
Payer: COMMERCIAL

## 2023-12-07 ENCOUNTER — OFFICE VISIT (OUTPATIENT)
Dept: PRIMARY CARE | Facility: CLINIC | Age: 53
End: 2023-12-07
Payer: COMMERCIAL

## 2023-12-07 VITALS
SYSTOLIC BLOOD PRESSURE: 142 MMHG | OXYGEN SATURATION: 94 % | DIASTOLIC BLOOD PRESSURE: 92 MMHG | WEIGHT: 193.4 LBS | HEIGHT: 73 IN | BODY MASS INDEX: 25.63 KG/M2 | TEMPERATURE: 96.6 F | HEART RATE: 98 BPM

## 2023-12-07 DIAGNOSIS — Z79.52 LONG-TERM CORTICOSTEROID USE: ICD-10-CM

## 2023-12-07 DIAGNOSIS — M06.4 INFLAMMATORY POLYARTHRITIS (MULTI): Primary | ICD-10-CM

## 2023-12-07 DIAGNOSIS — M79.89 SWELLING OF LOWER EXTREMITY: ICD-10-CM

## 2023-12-07 DIAGNOSIS — F17.210 CIGARETTE SMOKER: ICD-10-CM

## 2023-12-07 DIAGNOSIS — I10 PRIMARY HYPERTENSION: ICD-10-CM

## 2023-12-07 PROBLEM — R73.09 ELEVATED HEMOGLOBIN A1C: Status: ACTIVE | Noted: 2023-12-07

## 2023-12-07 PROCEDURE — 99214 OFFICE O/P EST MOD 30 MIN: CPT | Performed by: FAMILY MEDICINE

## 2023-12-07 PROCEDURE — 3077F SYST BP >= 140 MM HG: CPT | Performed by: FAMILY MEDICINE

## 2023-12-07 PROCEDURE — 4004F PT TOBACCO SCREEN RCVD TLK: CPT | Performed by: FAMILY MEDICINE

## 2023-12-07 PROCEDURE — 3080F DIAST BP >= 90 MM HG: CPT | Performed by: FAMILY MEDICINE

## 2023-12-07 RX ORDER — METHOTREXATE 2.5 MG/1
15 TABLET ORAL WEEKLY
COMMUNITY
Start: 2023-12-06 | End: 2024-11-06

## 2023-12-07 RX ORDER — LISINOPRIL 2.5 MG/1
2.5 TABLET ORAL DAILY
Qty: 30 TABLET | Refills: 1 | Status: SHIPPED | OUTPATIENT
Start: 2023-12-07 | End: 2024-01-17 | Stop reason: SDUPTHER

## 2023-12-07 RX ORDER — FOLIC ACID 1 MG/1
1 TABLET ORAL
COMMUNITY
Start: 2023-12-06 | End: 2024-12-05

## 2023-12-07 RX ORDER — PREDNISONE 5 MG/1
5 TABLET ORAL 2 TIMES DAILY
COMMUNITY
Start: 2023-12-06

## 2023-12-07 ASSESSMENT — ENCOUNTER SYMPTOMS
DIARRHEA: 0
DEPRESSION: 0
NAUSEA: 0
ACTIVITY CHANGE: 0
SINUS PAIN: 0
LOSS OF SENSATION IN FEET: 0
VOMITING: 0
SORE THROAT: 0
FATIGUE: 1
PALPITATIONS: 0
WEAKNESS: 1
APPETITE CHANGE: 0
OCCASIONAL FEELINGS OF UNSTEADINESS: 1
SINUS PRESSURE: 0
COUGH: 0
WHEEZING: 0

## 2023-12-07 NOTE — ASSESSMENT & PLAN NOTE
Reviewed medication list. Discussed his disability and noted in chart that Rheumatology got them and is reviewing them today. He does not feel that he can go back to the same job for 12 hours.

## 2023-12-07 NOTE — ASSESSMENT & PLAN NOTE
Discussed need to treat. May be up due to meds cut unsure. Will treat with low dose of antihypertensive until off prednisone and see if it is causing it. Added Lisinopril 2.5 mg daily. Follow up in a couple weeks.  BMP due.

## 2023-12-07 NOTE — PATIENT INSTRUCTIONS
Start Lisinopril 2.5 mg daily. This is to help your blood pressure. Follow up in couple weeks. Have lab done first.     Call [phone] for free resources to quit smoking, they also send nicotine patches if you want them.

## 2023-12-07 NOTE — ASSESSMENT & PLAN NOTE
Discussed CPAP. Order being faxed. If having issues tolerating, then he is to let me know so can refer him to Sleep Med.

## 2023-12-07 NOTE — PROGRESS NOTES
Subjective   Patient ID: Cleve Fowler is a 53 y.o. male who presents for Follow-up (3 month follow up BP, swelling and fatigue. ).    Here for 3 month followup.     Inflammatory arthritis - on Enbrel, Methotrexate, daily Prednisone. Also on Folic acid. On Enbrel for 3 months. Also on Methotrexate and Prednisone 5 mg bid. Prednisone was just increased.   He tried to go back to work 3 weeks, Was back November 9, works 2 twelve hour shifts, then off for 2 days. Took some PTO on 11/25 and 11/26. He got one week rest due to that. Was to work 11/29 and 11/30. He made it through the 29th. Could not go in on the 30th due to pain. He just started the methotrexate and was told it was going to take couple months for it to work. She bumped it up to 10 mg Prednisone and he is worried that he is in too much pain to work his shifts. No light duty at his job. He has a form to be filled out. Wants to know if should be me or other doctor he gives it to.     Leg swelling - was on Hydrochlorothiazide. Has not needed recently.     Current smoker - 38 pack years and no intention to quit. Had LDCT done in July and repeat due in July. Still around 1 ppd, was doing better for a while. He is interested in patches to help.     LUIS - sleep study showed that needs CPAP. He had issues with masks and is concerned about his ability to tolerate. Willing to try it and see Sleep Medicine if not better.     Due for Tdap.     Had colonoscopy and had Tubular adenoma. He is getting another one in March due to incomplete visualization.                    Current Outpatient Medications:     etanercept (EnbreL SureClick) 50 mg/mL (1 mL) pen injector pen injection, Inject 1 mL (50 mg) under the skin once a week., Disp: 4 mL, Rfl: 5    methotrexate (Trexall) 2.5 mg tablet, Take 6 tablets (15 mg total) by mouth once a week., Disp: , Rfl:     predniSONE (Deltasone) 5 mg tablet, Take 1 tablet (5 mg) by mouth 2 times a day., Disp: , Rfl:     folic acid  "(Folvite) 1 mg tablet, Take 1 tablet (1 mg) by mouth once daily., Disp: , Rfl:     hydroCHLOROthiazide (HYDRODiuril) 25 mg tablet, Take 1 tablet (25 mg) by mouth once daily as needed (swelling legs). (Patient not taking: Reported on 12/7/2023), Disp: 90 tablet, Rfl: 1    lisinopril 2.5 mg tablet, Take 1 tablet (2.5 mg) by mouth once daily., Disp: 30 tablet, Rfl: 1    traZODone (Desyrel) 50 mg tablet, Take 1 tablet (50 mg) by mouth once daily at bedtime., Disp: , Rfl:     Patient Active Problem List   Diagnosis    Inflammatory polyarthritis (CMS/HCC)    Cigarette smoker    Swelling of lower extremity    Sleep apnea    Daytime somnolence    Elevated hemoglobin A1c    Primary hypertension    Long-term corticosteroid use         Review of Systems   Constitutional:  Positive for fatigue. Negative for activity change and appetite change.   HENT:  Positive for congestion. Negative for postnasal drip, sinus pressure, sinus pain, sneezing and sore throat.    Respiratory:  Negative for cough and wheezing.    Cardiovascular:  Negative for chest pain and palpitations.   Gastrointestinal:  Negative for diarrhea, nausea and vomiting.   Neurological:  Positive for weakness.       Objective   BP (!) 142/92 (BP Location: Left arm, Patient Position: Sitting)   Pulse 98   Temp 35.9 °C (96.6 °F)   Ht 1.842 m (6' 0.5\")   Wt 87.7 kg (193 lb 6.4 oz)   SpO2 94%   BMI 25.87 kg/m²     Physical Exam  Vitals reviewed.   Constitutional:       General: He is not in acute distress.     Appearance: He is not ill-appearing.   Neck:      Vascular: No carotid bruit.   Cardiovascular:      Rate and Rhythm: Normal rate and regular rhythm.      Pulses: Normal pulses.      Heart sounds: No murmur heard.  Pulmonary:      Effort: Pulmonary effort is normal.      Breath sounds: Normal breath sounds.   Musculoskeletal:         General: No swelling or deformity.      Right lower leg: No edema.      Left lower leg: No edema.   Skin:     Findings: No " rash.   Neurological:      Mental Status: He is alert.   Psychiatric:         Mood and Affect: Mood normal.         Assessment/Plan   Problem List Items Addressed This Visit       Inflammatory polyarthritis (CMS/HCC) - Primary     Reviewed medication list. Discussed his disability and noted in chart that Rheumatology got them and is reviewing them today. He does not feel that he can go back to the same job for 12 hours.          Cigarette smoker     Discussed need to quit. He will call [phone] for patches.          Swelling of lower extremity     Has improved and not taking currently. Will resume prn swelling.          Primary hypertension     Discussed need to treat. May be up due to meds cut unsure. Will treat with low dose of antihypertensive until off prednisone and see if it is causing it. Added Lisinopril 2.5 mg daily. Follow up in a couple weeks.  BMP due.          Relevant Medications    lisinopril 2.5 mg tablet    Other Relevant Orders    Basic metabolic panel    Long-term corticosteroid use     Discussed need to not stop abruptly. Will check BMP, AIC.          Relevant Orders    Basic metabolic panel    Hemoglobin A1C         Assessment, plans, tests, and follow up discussed with patient and patient verbalized understanding. Cleve was given an opportunity to ask questions and  any concerns were addressed including but not limited to medications, work issues, follow up, new medication. .

## 2024-01-03 ENCOUNTER — APPOINTMENT (OUTPATIENT)
Dept: PRIMARY CARE | Facility: CLINIC | Age: 54
End: 2024-01-03
Payer: COMMERCIAL

## 2024-01-12 ENCOUNTER — LAB (OUTPATIENT)
Dept: LAB | Facility: LAB | Age: 54
End: 2024-01-12
Payer: COMMERCIAL

## 2024-01-12 DIAGNOSIS — I10 PRIMARY HYPERTENSION: ICD-10-CM

## 2024-01-12 DIAGNOSIS — Z79.52 LONG-TERM CORTICOSTEROID USE: ICD-10-CM

## 2024-01-12 LAB
ANION GAP SERPL CALC-SCNC: 13 MMOL/L (ref 10–20)
BUN SERPL-MCNC: 10 MG/DL (ref 6–23)
CALCIUM SERPL-MCNC: 9.1 MG/DL (ref 8.6–10.3)
CHLORIDE SERPL-SCNC: 101 MMOL/L (ref 98–107)
CO2 SERPL-SCNC: 26 MMOL/L (ref 21–32)
CREAT SERPL-MCNC: 0.79 MG/DL (ref 0.5–1.3)
EGFRCR SERPLBLD CKD-EPI 2021: >90 ML/MIN/1.73M*2
GLUCOSE SERPL-MCNC: 111 MG/DL (ref 74–99)
POTASSIUM SERPL-SCNC: 4.7 MMOL/L (ref 3.5–5.3)
SODIUM SERPL-SCNC: 135 MMOL/L (ref 136–145)

## 2024-01-12 PROCEDURE — 83036 HEMOGLOBIN GLYCOSYLATED A1C: CPT

## 2024-01-12 PROCEDURE — 36415 COLL VENOUS BLD VENIPUNCTURE: CPT

## 2024-01-12 PROCEDURE — 80048 BASIC METABOLIC PNL TOTAL CA: CPT

## 2024-01-13 LAB
EST. AVERAGE GLUCOSE BLD GHB EST-MCNC: 103 MG/DL
HBA1C MFR BLD: 5.2 %

## 2024-01-17 ENCOUNTER — OFFICE VISIT (OUTPATIENT)
Dept: PRIMARY CARE | Facility: CLINIC | Age: 54
End: 2024-01-17
Payer: COMMERCIAL

## 2024-01-17 VITALS
SYSTOLIC BLOOD PRESSURE: 119 MMHG | OXYGEN SATURATION: 93 % | BODY MASS INDEX: 26 KG/M2 | HEART RATE: 99 BPM | HEIGHT: 73 IN | WEIGHT: 196.2 LBS | TEMPERATURE: 98.7 F | DIASTOLIC BLOOD PRESSURE: 85 MMHG

## 2024-01-17 DIAGNOSIS — I10 PRIMARY HYPERTENSION: ICD-10-CM

## 2024-01-17 DIAGNOSIS — M06.4 INFLAMMATORY POLYARTHROPATHY (MULTI): ICD-10-CM

## 2024-01-17 DIAGNOSIS — R73.09 ELEVATED HEMOGLOBIN A1C: Primary | ICD-10-CM

## 2024-01-17 PROCEDURE — 99214 OFFICE O/P EST MOD 30 MIN: CPT | Performed by: FAMILY MEDICINE

## 2024-01-17 PROCEDURE — 3079F DIAST BP 80-89 MM HG: CPT | Performed by: FAMILY MEDICINE

## 2024-01-17 PROCEDURE — 3074F SYST BP LT 130 MM HG: CPT | Performed by: FAMILY MEDICINE

## 2024-01-17 RX ORDER — LISINOPRIL 2.5 MG/1
2.5 TABLET ORAL DAILY
Qty: 90 TABLET | Refills: 3 | Status: SHIPPED | OUTPATIENT
Start: 2024-01-17 | End: 2025-01-16

## 2024-01-17 ASSESSMENT — ENCOUNTER SYMPTOMS
POLYPHAGIA: 0
COUGH: 0
UNEXPECTED WEIGHT CHANGE: 0
POLYDIPSIA: 0
SHORTNESS OF BREATH: 0
CONFUSION: 0
PALPITATIONS: 0
HEADACHES: 0

## 2024-01-17 NOTE — PATIENT INSTRUCTIONS
Continue Lisinopril 2.5 mg.   If your prednisone gets cut back, your blood pressure may go lower so will need to check that if occurs. IF less than 110/70, will need to stop the Lisinopril.     Will decide on labs when you are back. Want to see what labs have been done by others first.     You are due for Tdap and Shingles shots.

## 2024-01-17 NOTE — ASSESSMENT & PLAN NOTE
Doing a little better with Methotrexate. Some confusion re paperwork. His work did not act on it when sent 12/14. Told him that he needs to have new disability form to extend him. He has not been back to work. Waiting on release to go back from Dr. Shaw. He is having concerns about that. Was January 12 when work straightened it out.   He is wanting to see rheum closer. There is one in  [place]. Referred external to [facility] in [place] which works better for him. He will continue with current provider until he sees the new one.

## 2024-01-17 NOTE — PROGRESS NOTES
Subjective   Patient ID: Cleve Fowler is a 54 y.o. male who presents for Follow-up (3-4 week follow up bp and medication.).    RA - on Enbrel, Pred 10 daily, Methotrexate now. Arthritis is better some days than others. He wants to be released back to work. Disability was to end 12/14. Had note from Dr. Hooks. [place] was on vacation until beginning of year. Did not approve him until [place] got back so could not go back to work. They told him that he needed to get extension until January. Had form sent to Dr. Hooks yesterday.   He is wanting to go to see a new rheumatologist. He wants to go closer.     He does not have an appointment for 3 months.     Had added Lisinopril 2.5 mg daily due to BP up on Prednisone.            Current Outpatient Medications:     etanercept (EnbreL SureClick) 50 mg/mL (1 mL) pen injector pen injection, Inject 1 mL (50 mg) under the skin once a week., Disp: 4 mL, Rfl: 5    folic acid (Folvite) 1 mg tablet, Take 1 tablet (1 mg) by mouth once daily., Disp: , Rfl:     methotrexate (Trexall) 2.5 mg tablet, Take 6 tablets (15 mg total) by mouth once a week., Disp: , Rfl:     predniSONE (Deltasone) 5 mg tablet, Take 1 tablet (5 mg) by mouth 2 times a day., Disp: , Rfl:     lisinopril 2.5 mg tablet, Take 1 tablet (2.5 mg) by mouth once daily., Disp: 90 tablet, Rfl: 3    traZODone (Desyrel) 50 mg tablet, Take 1 tablet (50 mg) by mouth once daily at bedtime., Disp: , Rfl:     Patient Active Problem List   Diagnosis    Inflammatory polyarthropathy (CMS/HCC)    Cigarette smoker    Swelling of lower extremity    Sleep apnea    Daytime somnolence    Elevated hemoglobin A1c    Primary hypertension    Long-term corticosteroid use         Review of Systems   Constitutional:  Negative for unexpected weight change.   Respiratory:  Negative for cough and shortness of breath.    Cardiovascular:  Negative for chest pain, palpitations and leg swelling.   Endocrine: Negative for polydipsia, polyphagia and  "polyuria.   Skin:  Negative for rash.   Neurological:  Negative for headaches.   Psychiatric/Behavioral:  Negative for confusion.        Objective   /85 (BP Location: Left arm, Patient Position: Sitting)   Pulse 99   Temp 37.1 °C (98.7 °F)   Ht 1.842 m (6' 0.5\")   Wt 89 kg (196 lb 3.2 oz)   SpO2 93%   BMI 26.24 kg/m²     Physical Exam  Vitals reviewed.   Constitutional:       Appearance: Normal appearance.   Pulmonary:      Effort: Pulmonary effort is normal.   Neurological:      Mental Status: He is alert.   Psychiatric:         Mood and Affect: Mood normal.         Behavior: Behavior normal.       Recent Results (from the past 672 hour(s))   Basic metabolic panel    Collection Time: 01/12/24  1:43 PM   Result Value Ref Range    Glucose 111 (H) 74 - 99 mg/dL    Sodium 135 (L) 136 - 145 mmol/L    Potassium 4.7 3.5 - 5.3 mmol/L    Chloride 101 98 - 107 mmol/L    Bicarbonate 26 21 - 32 mmol/L    Anion Gap 13 10 - 20 mmol/L    Urea Nitrogen 10 6 - 23 mg/dL    Creatinine 0.79 0.50 - 1.30 mg/dL    eGFR >90 >60 mL/min/1.73m*2    Calcium 9.1 8.6 - 10.3 mg/dL   Hemoglobin A1C    Collection Time: 01/12/24  1:43 PM   Result Value Ref Range    Hemoglobin A1C 5.2 see below %    Estimated Average Glucose 103 Not Established mg/dL         Assessment/Plan   Problem List Items Addressed This Visit       Inflammatory polyarthropathy (CMS/HCC)     Doing a little better with Methotrexate. Some confusion re paperwork. His work did not act on it when sent 12/14. Told him that he needs to have new disability form to extend him. He has not been back to work. Waiting on release to go back from Dr. Shaw. He is having concerns about that. Was January 12 when work straightened it out.   He is wanting to see rheum closer. There is one in  [place]. Referred external to [facility] in [place] which works better for him. He will continue with current provider until he sees the new one.          Relevant Orders    Referral to " Rheumatology    Elevated hemoglobin A1c - Primary     Repeat A1C 5.3 on Prednisone. Reviewed with patient.          Primary hypertension     BP elevated on Prednisone. On Lisinopril 2.5 without issue. BP at goal. Continue. Recheck in 6 months.          Relevant Medications    lisinopril 2.5 mg tablet         Assessment, plans, tests, and follow up discussed with patient and patient verbalized understanding. Cleve was given an opportunity to ask questions and  any concerns were addressed including but not limited to medications, follow up, need to check BP if go down on Prednisone as may no longer need Lisinopril.

## 2024-01-17 NOTE — ASSESSMENT & PLAN NOTE
BP elevated on Prednisone. On Lisinopril 2.5 without issue. BP at goal. Continue. Recheck in 6 months.

## 2024-01-24 ENCOUNTER — TELEPHONE (OUTPATIENT)
Dept: PRIMARY CARE | Facility: CLINIC | Age: 54
End: 2024-01-24
Payer: COMMERCIAL

## 2024-01-24 NOTE — TELEPHONE ENCOUNTER
Just an DRAGAN Daniel from [facility] called to let us know that this patient was a no show for his appointment to get his Cpap equipment.       I called the patient and instructed him to reschedule that appointment .

## 2024-02-01 NOTE — TELEPHONE ENCOUNTER
Patient did call to reschedule his C-Pap appointment for Jan 31st. He was a no show for this appointment as well.    Just an FYI

## 2024-03-12 ENCOUNTER — APPOINTMENT (OUTPATIENT)
Dept: RHEUMATOLOGY | Facility: CLINIC | Age: 54
End: 2024-03-12
Payer: COMMERCIAL

## 2024-04-19 ENCOUNTER — OFFICE VISIT (OUTPATIENT)
Dept: RHEUMATOLOGY | Facility: CLINIC | Age: 54
End: 2024-04-19
Payer: COMMERCIAL

## 2024-04-19 ENCOUNTER — LAB (OUTPATIENT)
Dept: LAB | Facility: LAB | Age: 54
End: 2024-04-19
Payer: COMMERCIAL

## 2024-04-19 VITALS
BODY MASS INDEX: 25.9 KG/M2 | SYSTOLIC BLOOD PRESSURE: 109 MMHG | DIASTOLIC BLOOD PRESSURE: 70 MMHG | TEMPERATURE: 97.6 F | HEART RATE: 73 BPM | HEIGHT: 73 IN | WEIGHT: 195.4 LBS

## 2024-04-19 DIAGNOSIS — M05.771 RHEUMATOID ARTHRITIS INVOLVING BOTH ANKLES WITH POSITIVE RHEUMATOID FACTOR (MULTI): ICD-10-CM

## 2024-04-19 DIAGNOSIS — M05.772 RHEUMATOID ARTHRITIS INVOLVING BOTH ANKLES WITH POSITIVE RHEUMATOID FACTOR (MULTI): ICD-10-CM

## 2024-04-19 DIAGNOSIS — M05.772 RHEUMATOID ARTHRITIS INVOLVING BOTH ANKLES WITH POSITIVE RHEUMATOID FACTOR (MULTI): Primary | ICD-10-CM

## 2024-04-19 DIAGNOSIS — M06.4 INFLAMMATORY POLYARTHROPATHY (MULTI): ICD-10-CM

## 2024-04-19 DIAGNOSIS — M05.771 RHEUMATOID ARTHRITIS INVOLVING BOTH ANKLES WITH POSITIVE RHEUMATOID FACTOR (MULTI): Primary | ICD-10-CM

## 2024-04-19 LAB
ALBUMIN SERPL BCP-MCNC: 4 G/DL (ref 3.4–5)
ALP SERPL-CCNC: 77 U/L (ref 33–120)
ALT SERPL W P-5'-P-CCNC: 20 U/L (ref 10–52)
ANION GAP SERPL CALC-SCNC: 12 MMOL/L (ref 10–20)
AST SERPL W P-5'-P-CCNC: 17 U/L (ref 9–39)
BASOPHILS # BLD AUTO: 0.05 X10*3/UL (ref 0–0.1)
BASOPHILS NFR BLD AUTO: 0.8 %
BILIRUB SERPL-MCNC: 0.7 MG/DL (ref 0–1.2)
BUN SERPL-MCNC: 10 MG/DL (ref 6–23)
CALCIUM SERPL-MCNC: 9.4 MG/DL (ref 8.6–10.6)
CHLORIDE SERPL-SCNC: 104 MMOL/L (ref 98–107)
CO2 SERPL-SCNC: 27 MMOL/L (ref 21–32)
CREAT SERPL-MCNC: 0.79 MG/DL (ref 0.5–1.3)
CRP SERPL-MCNC: 0.55 MG/DL
EGFRCR SERPLBLD CKD-EPI 2021: >90 ML/MIN/1.73M*2
EOSINOPHIL # BLD AUTO: 0.25 X10*3/UL (ref 0–0.7)
EOSINOPHIL NFR BLD AUTO: 4.1 %
ERYTHROCYTE [DISTWIDTH] IN BLOOD BY AUTOMATED COUNT: 12.5 % (ref 11.5–14.5)
ERYTHROCYTE [SEDIMENTATION RATE] IN BLOOD BY WESTERGREN METHOD: 13 MM/H (ref 0–20)
GLUCOSE SERPL-MCNC: 99 MG/DL (ref 74–99)
HCT VFR BLD AUTO: 45.4 % (ref 41–52)
HGB BLD-MCNC: 14.7 G/DL (ref 13.5–17.5)
IMM GRANULOCYTES # BLD AUTO: 0.02 X10*3/UL (ref 0–0.7)
IMM GRANULOCYTES NFR BLD AUTO: 0.3 % (ref 0–0.9)
LYMPHOCYTES # BLD AUTO: 1.27 X10*3/UL (ref 1.2–4.8)
LYMPHOCYTES NFR BLD AUTO: 20.7 %
MCH RBC QN AUTO: 33.3 PG (ref 26–34)
MCHC RBC AUTO-ENTMCNC: 32.4 G/DL (ref 32–36)
MCV RBC AUTO: 103 FL (ref 80–100)
MONOCYTES # BLD AUTO: 0.67 X10*3/UL (ref 0.1–1)
MONOCYTES NFR BLD AUTO: 10.9 %
NEUTROPHILS # BLD AUTO: 3.89 X10*3/UL (ref 1.2–7.7)
NEUTROPHILS NFR BLD AUTO: 63.2 %
NRBC BLD-RTO: 0 /100 WBCS (ref 0–0)
PLATELET # BLD AUTO: 165 X10*3/UL (ref 150–450)
POTASSIUM SERPL-SCNC: 4.4 MMOL/L (ref 3.5–5.3)
PROT SERPL-MCNC: 6.4 G/DL (ref 6.4–8.2)
RBC # BLD AUTO: 4.41 X10*6/UL (ref 4.5–5.9)
SODIUM SERPL-SCNC: 139 MMOL/L (ref 136–145)
WBC # BLD AUTO: 6.2 X10*3/UL (ref 4.4–11.3)

## 2024-04-19 PROCEDURE — 85652 RBC SED RATE AUTOMATED: CPT

## 2024-04-19 PROCEDURE — 86140 C-REACTIVE PROTEIN: CPT

## 2024-04-19 PROCEDURE — 99214 OFFICE O/P EST MOD 30 MIN: CPT | Performed by: INTERNAL MEDICINE

## 2024-04-19 PROCEDURE — 85025 COMPLETE CBC W/AUTO DIFF WBC: CPT

## 2024-04-19 PROCEDURE — 3074F SYST BP LT 130 MM HG: CPT | Performed by: INTERNAL MEDICINE

## 2024-04-19 PROCEDURE — 80053 COMPREHEN METABOLIC PANEL: CPT

## 2024-04-19 PROCEDURE — 99204 OFFICE O/P NEW MOD 45 MIN: CPT | Performed by: INTERNAL MEDICINE

## 2024-04-19 PROCEDURE — 3078F DIAST BP <80 MM HG: CPT | Performed by: INTERNAL MEDICINE

## 2024-04-19 PROCEDURE — 36415 COLL VENOUS BLD VENIPUNCTURE: CPT

## 2024-04-19 ASSESSMENT — PAIN SCALES - GENERAL: PAINLEVEL: 3

## 2024-04-19 NOTE — PROGRESS NOTES
Continuation of therapy - Enbrel 50mg/mL pens under the skin once weekly. Previous [facility] pt. Updated rx sent to [facility] for [place]/copay assistance

## 2024-04-19 NOTE — PROGRESS NOTES
Subjective   Patient ID: Cleve Fowler is a 54 y.o. male who presents for Rheumatoid Arthritis.    HPI  52 yo male with seropositive RA  His joint pain started in 06/23, he had polyarticular arthritis at that time.    7/11/2023   Right knee joint Synovial fluid WBC 9514  Cx: no growth after 3 days    VAMSI by IFA negative  RF > 600 (0-15)   CCP >250  ESR 60 (0-20)  CRP 8.72 (<1.0)      He was placed on etanercept 50 mg sq weekly with prednisone. Then, MTX was added and PRD was tapered off.  His symptoms improved, but his joint pain came back because he needs to copay for Enbrel.  He received his last Enbrel 2 weeks ago.  He denies AM stiffness or joint swelling  His pain got worse in last week.    Current meds:  Enbrel 50 mg/week  MTX 15/ week  Folic acid 1 mg    ROS  Joint pain in hands: negative   Joint swelling: negative  Morning stiffness and duration: negative   strength: normal  Oral ulcer: negative  Genital ulcer: negative  Raynaud phenomenon: negative  Chest pain/dyspnea: negative  Low back pain: negative  Visual problem: negative  Dry eyes/dry mouth: negative  Skin rash/scaling/psoriasis: negative       Objective     PEXAM  VS reviewed, WNL  General: Alert, no distress   HEENT: Normocephalic/atraumatic, No alopecia. PERRLA. Sclera white, conjunctiva pink, no malar rash. no oral or nasal ulcer. Oral cavity pink and moist, no erythema or exudate, dentition good.   Neck: supple  Respiratory: CTA B, no adventitious breath sounds  Cardiac: RRR, no murmurs, carotid, or bruits  Abdominal: symmetrical, soft, non-tender, non-distended, normoactive BSx4 quadrants, no CVA tenderness or suprapubic tenderness  MSK: Joints of upper and lower extremities were assessed for synovitis and ROM.    Today she has no evidence of synovitis in the joints of her hands or wrists, tender joint count 0, swollen joint count 0   Extremities: no clubbing, no cyanosis, no edema  Skin: Skin warm and moist.   Neuro: non-focal,  Strength 5/5 throughout. Normal gait. No cerebellar pathologic exam     Assessment/Plan   Seropositive RA  Diagnosis based on +RF and CCP antibodies with diffuse synovitis and swelling in the setting of inflammatory markers in 07/23  He has been using Enbrel, MTX 15, and his arthritis is controlled well.  But, he has insurance problem and needs to pay much for Enbrel. He did not receive his Enbrel for 2 weeks and his knee, ankle pain got worse.  PExam showed tender and swollen ankle joints.  -will see his acute phases  -will increase his MTX to 20 mg/week  -talked to him about smoking and its effects on arthritis  -will talk to pharmacy team for any TNFi or bilogics without any copayment  -will see him in 3-4 months

## 2024-04-22 DIAGNOSIS — M05.771 RHEUMATOID ARTHRITIS INVOLVING BOTH ANKLES WITH POSITIVE RHEUMATOID FACTOR (MULTI): ICD-10-CM

## 2024-04-22 DIAGNOSIS — M05.772 RHEUMATOID ARTHRITIS INVOLVING BOTH ANKLES WITH POSITIVE RHEUMATOID FACTOR (MULTI): ICD-10-CM

## 2024-06-11 ENCOUNTER — TELEPHONE (OUTPATIENT)
Dept: RHEUMATOLOGY | Facility: CLINIC | Age: 54
End: 2024-06-11

## 2024-06-11 NOTE — LETTER
June 13, 2024     Patient: Cleve Fowler   YOB: 1970   Date of Visit: 6/11/2024       To Whom It May Concern:    Cleve Fowler has rheumatoid arthritis and follows with rheumatology at .     If you have any questions or concerns, please don't hesitate to call.         Sincerely,         Jay Riley        CC: No Recipients

## 2024-06-11 NOTE — TELEPHONE ENCOUNTER
Patient needs a letter explaining that he has rheumatoid arthritis. Patient also needs an alternative for enbrel. He said enbrel is too expensive. Please advise.

## 2024-06-12 NOTE — TELEPHONE ENCOUNTER
- Covering for Dr. Paul. Work note sent to patient and in the system.   - Can we check for biosmiliar in terms of coverage. Thanks so much

## 2024-06-13 NOTE — TELEPHONE ENCOUNTER
Eusebio Riley,     Your letter to the patient didn't go through. He now needs short term disability paperwork filled out. [employer] will be contacting us. Patient also wants to request steroids while his enbrel alternative is in limbo. Please advise.

## 2024-06-18 NOTE — TELEPHONE ENCOUNTER
Faxed letter to [employer] previously after provider's request r/t STD.   Claim ID: [id number]  Spoke to patient earlier today and needs letter to state okay to use can during work for a lifetime as needed and okay to return to work back date to 6/3/24. Will update pt further directly after we get this letter redone and can provide a copy to him if he needs it. Will request permission from provider if I can edit.

## 2024-06-20 ENCOUNTER — DOCUMENTATION (OUTPATIENT)
Dept: RHEUMATOLOGY | Facility: CLINIC | Age: 54
End: 2024-06-20
Payer: COMMERCIAL

## 2024-06-26 DIAGNOSIS — M06.4 INFLAMMATORY POLYARTHROPATHY (MULTI): Primary | ICD-10-CM

## 2024-06-26 RX ORDER — PREDNISONE 5 MG/1
10 TABLET ORAL 2 TIMES DAILY
Qty: 84 TABLET | Refills: 1 | Status: SHIPPED | OUTPATIENT
Start: 2024-06-26 | End: 2024-07-26

## 2024-07-01 DIAGNOSIS — M06.4 INFLAMMATORY POLYARTHROPATHY (MULTI): ICD-10-CM

## 2024-07-01 DIAGNOSIS — M05.771 RHEUMATOID ARTHRITIS INVOLVING BOTH ANKLES WITH POSITIVE RHEUMATOID FACTOR (MULTI): Primary | ICD-10-CM

## 2024-07-01 DIAGNOSIS — M05.772 RHEUMATOID ARTHRITIS INVOLVING BOTH ANKLES WITH POSITIVE RHEUMATOID FACTOR (MULTI): Primary | ICD-10-CM

## 2024-07-01 RX ORDER — ADALIMUMAB-ADAZ 40 MG/.4ML
40 INJECTION, SOLUTION SUBCUTANEOUS
Qty: 0.4 ML | Refills: 3 | Status: SHIPPED | OUTPATIENT
Start: 2024-07-01 | End: 2024-08-30

## 2024-07-02 ENCOUNTER — TELEPHONE (OUTPATIENT)
Dept: RHEUMATOLOGY | Facility: CLINIC | Age: 54
End: 2024-07-02

## 2024-07-02 NOTE — TELEPHONE ENCOUNTER
Patient would like to speak with Mirella about his return to work note. He states his boss and HR wants special forms filled out. He will drop off to you tomorrow at [facility]. He can be reached at [phone].

## 2024-07-03 NOTE — TELEPHONE ENCOUNTER
Left VM for pt to call me back. (Maybe it wasn't dropped of yet as I haven't received any documents regarding this message from pt)

## 2024-07-09 NOTE — TELEPHONE ENCOUNTER
Updated pt directly today that form that he delivered yesterday was faxed his HR earlier today. Sent him a copy the fax we sent to his HR to his direct email. Sent to office to scan into chart.

## 2024-07-16 NOTE — TELEPHONE ENCOUNTER
Patient is calling expressing that his HR did not receive the disability paperwork. The fax is [phone]. He would need the date changed in the re-fax.

## 2024-07-16 NOTE — TELEPHONE ENCOUNTER
Received direct message from pt and spoke to him earlier today. Emailed pt again and faxed again to HR. Sent the confirmation fax that this went through to their HR also to patient.

## 2024-07-17 ENCOUNTER — APPOINTMENT (OUTPATIENT)
Dept: PRIMARY CARE | Facility: CLINIC | Age: 54
End: 2024-07-17
Payer: COMMERCIAL

## 2024-07-17 VITALS
BODY MASS INDEX: 25.73 KG/M2 | SYSTOLIC BLOOD PRESSURE: 124 MMHG | TEMPERATURE: 98.3 F | DIASTOLIC BLOOD PRESSURE: 83 MMHG | HEART RATE: 81 BPM | OXYGEN SATURATION: 91 % | WEIGHT: 195 LBS

## 2024-07-17 DIAGNOSIS — N52.2 DRUG-INDUCED ERECTILE DYSFUNCTION: ICD-10-CM

## 2024-07-17 DIAGNOSIS — Z00.00 WELL ADULT EXAM: Primary | ICD-10-CM

## 2024-07-17 DIAGNOSIS — F17.210 CIGARETTE SMOKER: ICD-10-CM

## 2024-07-17 DIAGNOSIS — Z13.220 SCREENING, LIPID: ICD-10-CM

## 2024-07-17 DIAGNOSIS — R73.09 ELEVATED HEMOGLOBIN A1C: ICD-10-CM

## 2024-07-17 PROCEDURE — 3074F SYST BP LT 130 MM HG: CPT | Performed by: FAMILY MEDICINE

## 2024-07-17 PROCEDURE — 99396 PREV VISIT EST AGE 40-64: CPT | Performed by: FAMILY MEDICINE

## 2024-07-17 PROCEDURE — 90715 TDAP VACCINE 7 YRS/> IM: CPT | Performed by: FAMILY MEDICINE

## 2024-07-17 PROCEDURE — 3079F DIAST BP 80-89 MM HG: CPT | Performed by: FAMILY MEDICINE

## 2024-07-17 PROCEDURE — 90471 IMMUNIZATION ADMIN: CPT | Performed by: FAMILY MEDICINE

## 2024-07-17 RX ORDER — SILDENAFIL 100 MG/1
100 TABLET, FILM COATED ORAL DAILY PRN
Qty: 30 TABLET | Refills: 2 | Status: SHIPPED | OUTPATIENT
Start: 2024-07-17 | End: 2024-10-15

## 2024-07-17 ASSESSMENT — ENCOUNTER SYMPTOMS
DIFFICULTY URINATING: 0
COUGH: 0
SHORTNESS OF BREATH: 0
ARTHRALGIAS: 1
APPETITE CHANGE: 0
BLOOD IN STOOL: 0
VOMITING: 0
TROUBLE SWALLOWING: 0
NERVOUS/ANXIOUS: 0
ACTIVITY CHANGE: 1
WHEEZING: 0
DIZZINESS: 0
SEIZURES: 0
DECREASED CONCENTRATION: 0
CONSTIPATION: 0
PALPITATIONS: 0
DYSPHORIC MOOD: 0
FREQUENCY: 0
DIARRHEA: 0
FATIGUE: 0
POLYPHAGIA: 0
HEADACHES: 0
POLYDIPSIA: 0
JOINT SWELLING: 0
NAUSEA: 0
UNEXPECTED WEIGHT CHANGE: 0
ABDOMINAL PAIN: 0

## 2024-07-17 NOTE — PROGRESS NOTES
Subjective   Patient ID: Cleve Fowler is a 54 y.o. male who presents for Follow-up (6 month follow up ) and Annual Exam (Physical).    Here for 6 month follow up on HTN, elevated A1C. No labs yet.     Being treated by Rheumatology for Inflammatory Polyarthritis. Reviewed meds. Has been off on Disability due to ankles. He is off his Enbrel, still on methotrexate. Also on Prednisone 5 mg daily.     On Lisinopril 2.5 mg daily. No side effects,       Smoker 40.5 pack years - LDCT ordered one year ago    Due for physical as well    Here for annual wellness exam. Last wellness ovr a r.     New concerns: Having erection issues for the last year. Would like medication to help him. No problems urinating.   Feels: fairly well    Changes  to health/medications since last visit as above  Other providers seen since last visit Rheumatology    Healthy lifestyle habits: Regular exercise: little when flared                                        Dietary habits: good                                          Social connections/relationships good                                          Wears seatbelts Yes                                         Sunscreen Yes                                         Sexual Risk FactorsNo      Health screenings:  PSA not applicable                                  Colon screening September 2023, repeat in 3 yrs due to polyp                                  Hep C screening Yes                                   HIV screening yes                                  STI screeningnot applicable                          Health risks: Domestic Violence no                      Work-life balance 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                       Smoke detectors Yes                       Carbon monoxide detectors yes                      Gun safety not applicable                      Second-hand Smoke No                       Occupational Risks No      Immunizations:  Influenza:  Yes                             Tdap: Due                            HPV: not applicable                           Pneumoniaonia: Prevnar 20 in 2023                           Shingrix: not done yet                           COVID: no civid shots.                Current Outpatient Medications:     folic acid (Folvite) 1 mg tablet, Take 1 tablet (1 mg) by mouth once daily., Disp: , Rfl:     lisinopril 2.5 mg tablet, Take 1 tablet (2.5 mg) by mouth once daily., Disp: 90 tablet, Rfl: 3    methotrexate (Trexall) 2.5 mg tablet, Take 6 tablets (15 mg total) by mouth once a week., Disp: , Rfl:     predniSONE (Deltasone) 5 mg tablet, Take 2 tablets (10 mg) by mouth 2 times a day., Disp: 84 tablet, Rfl: 1    adalimumab-adaz 40 mg/0.4 mL pen injector, Inject 40 mg under the skin every 14 (fourteen) days. (Patient not taking: Reported on 7/17/2024), Disp: 0.4 mL, Rfl: 3    sildenafil (Viagra) 100 mg tablet, Take 1 tablet (100 mg) by mouth once daily as needed for erectile dysfunction., Disp: 30 tablet, Rfl: 2    Patient Active Problem List   Diagnosis    Inflammatory polyarthropathy (Multi)    Cigarette smoker    Swelling of lower extremity    Sleep apnea    Daytime somnolence    Elevated hemoglobin A1c    Primary hypertension    Long-term corticosteroid use    Drug-induced erectile dysfunction         Review of Systems   Constitutional:  Positive for activity change. Negative for appetite change, fatigue and unexpected weight change.   HENT:  Negative for dental problem, hearing loss and trouble swallowing.    Eyes:  Negative for visual disturbance.   Respiratory:  Negative for cough, shortness of breath and wheezing.    Cardiovascular:  Negative for chest pain, palpitations and leg swelling.   Gastrointestinal:  Negative for abdominal pain, blood in stool, constipation, diarrhea, nausea and vomiting.   Endocrine: Negative for cold intolerance, heat intolerance, polydipsia, polyphagia and  polyuria.   Genitourinary:  Negative for difficulty urinating, frequency, testicular pain and urgency.   Musculoskeletal:  Positive for arthralgias. Negative for joint swelling.   Skin:  Negative for rash.   Neurological:  Negative for dizziness, seizures, syncope and headaches.   Psychiatric/Behavioral:  Negative for behavioral problems, decreased concentration and dysphoric mood. The patient is not nervous/anxious.        Objective   /83 (BP Location: Left arm, Patient Position: Sitting)   Pulse 81   Temp 36.8 °C (98.3 °F)   Wt 88.5 kg (195 lb)   SpO2 91%   BMI 25.73 kg/m²     Physical Exam  Constitutional:       Appearance: Normal appearance.   HENT:      Head: Normocephalic and atraumatic.      Right Ear: Tympanic membrane normal.      Left Ear: Tympanic membrane normal.      Nose: Nose normal.      Mouth/Throat:      Pharynx: Oropharynx is clear.   Eyes:      Extraocular Movements: Extraocular movements intact.      Conjunctiva/sclera: Conjunctivae normal.      Pupils: Pupils are equal, round, and reactive to light.   Neck:      Vascular: No carotid bruit.   Cardiovascular:      Rate and Rhythm: Normal rate and regular rhythm.      Pulses: Normal pulses.      Heart sounds: No murmur heard.  Pulmonary:      Effort: Pulmonary effort is normal.      Breath sounds: Normal breath sounds.   Abdominal:      Palpations: There is no hepatomegaly, splenomegaly or mass.      Tenderness: There is no abdominal tenderness.   Musculoskeletal:         General: Normal range of motion.      Cervical back: Normal range of motion.      Right lower leg: No edema.      Left lower leg: No edema.   Skin:     General: Skin is warm and dry.      Findings: No rash.   Neurological:      General: No focal deficit present.      Mental Status: He is alert. Mental status is at baseline.      Gait: Gait is intact.   Psychiatric:         Mood and Affect: Mood normal.         Thought Content: Thought content normal.          Assessment/Plan   Problem List Items Addressed This Visit       Cigarette smoker     LDCT ordered one yr ago. Did not schedule yet. 40.5 pack yr history. Still smoking. Encouraged to quit but not ready. Recommended LDCT. He agreed to have it done.            Relevant Orders    CT lung screening low dose    Elevated hemoglobin A1c     Due for repeat.          Relevant Orders    Hemoglobin A1C    Drug-induced erectile dysfunction    Relevant Medications    sildenafil (Viagra) 100 mg tablet     Other Visit Diagnoses       Well adult exam    -  Primary    Discussed health maintenance for age. Activity limited by arthritis symptoms    Screening, lipid        Relevant Orders    Lipid Panel              Assessment, plans, tests, and follow up discussed with patient and patient verbalized understanding. Cleve was given an opportunity to ask questions and  any concerns were addressed including but not limited to medication, follow up, lab orders. .

## 2024-07-17 NOTE — PATIENT INSTRUCTIONS
Hav fasting lab done with Dr. Montoya next labs.     Follow up in 3 months    You had Tdap today.     Shingrix due. Flu shot in fall,     Chest CT to screen for lung cancer ordered.     Sildenafil (Viagra) 100 mg - use no more than once a day as needed. Use [facility]. It was sent in to [facility] include [facility] beds at the [facility]

## 2024-07-17 NOTE — ASSESSMENT & PLAN NOTE
LDCT ordered one yr ago. Did not schedule yet. 40.5 pack yr history. Still smoking. Encouraged to quit but not ready. Recommended LDCT. He agreed to have it done.

## 2024-07-31 ENCOUNTER — HOSPITAL ENCOUNTER (OUTPATIENT)
Dept: RADIOLOGY | Facility: CLINIC | Age: 54
Discharge: HOME | End: 2024-07-31
Payer: COMMERCIAL

## 2024-07-31 DIAGNOSIS — F17.210 CIGARETTE SMOKER: ICD-10-CM

## 2024-07-31 PROCEDURE — 71271 CT THORAX LUNG CANCER SCR C-: CPT

## 2024-08-02 ENCOUNTER — TELEPHONE (OUTPATIENT)
Dept: PRIMARY CARE | Facility: CLINIC | Age: 54
End: 2024-08-02
Payer: COMMERCIAL

## 2024-08-02 NOTE — TELEPHONE ENCOUNTER
Low dose CT showed chronic changes consistent with his smoking history.   There were some small areas which were likely just that but they want to repeat in 6 months to be sure nothing changes.     He had some calcium in his coronary arteries. This can be a marker for heart disease. Need to keep sugar, blood pressure and cholesterol at goal, stop smoking and practice a healthy lifestyle.     Has sanjuanita patel in October and can address at that visit.

## 2024-08-19 ENCOUNTER — TELEPHONE (OUTPATIENT)
Dept: RHEUMATOLOGY | Facility: CLINIC | Age: 54
End: 2024-08-19
Payer: COMMERCIAL

## 2024-08-19 NOTE — TELEPHONE ENCOUNTER
Patient is supposed to be getting a replacement for his enbrel but he hasn't heard any updates about this. Please advise.

## 2024-08-23 ENCOUNTER — TELEPHONE (OUTPATIENT)
Dept: RHEUMATOLOGY | Facility: CLINIC | Age: 54
End: 2024-08-23
Payer: COMMERCIAL

## 2024-08-23 NOTE — TELEPHONE ENCOUNTER
Patient left a VM and indicates that he needs to know what the status is on his Humira approval. Please call him today at [phone].

## 2024-08-26 NOTE — TELEPHONE ENCOUNTER
Return call placed to patient to make aware of authorization on file and being good until 12/28/24. This nurse also made aware of pharmacy [facility] that he must call and set up delivery with. Encouraged to call office with any questions should they arise.

## 2024-08-28 NOTE — TELEPHONE ENCOUNTER
Return call placed to patient and number to pharmacy left to set up delivery. Encouraged to call office or send over [employer] message should any questions occur.

## 2024-10-21 ENCOUNTER — APPOINTMENT (OUTPATIENT)
Dept: PRIMARY CARE | Facility: CLINIC | Age: 54
End: 2024-10-21
Payer: COMMERCIAL

## 2024-12-23 DIAGNOSIS — M05.771 RHEUMATOID ARTHRITIS INVOLVING BOTH ANKLES WITH POSITIVE RHEUMATOID FACTOR (MULTI): Primary | ICD-10-CM

## 2024-12-23 DIAGNOSIS — M05.772 RHEUMATOID ARTHRITIS INVOLVING BOTH ANKLES WITH POSITIVE RHEUMATOID FACTOR (MULTI): Primary | ICD-10-CM

## 2024-12-23 RX ORDER — ADALIMUMAB-ADAZ 40 MG/.4ML
INJECTION, SOLUTION SUBCUTANEOUS
Qty: 0.8 ML | Refills: 1 | Status: SHIPPED | OUTPATIENT
Start: 2024-12-23 | End: 2025-04-22

## 2025-01-20 ENCOUNTER — TELEPHONE (OUTPATIENT)
Dept: GASTROENTEROLOGY | Facility: CLINIC | Age: 55
End: 2025-01-20
Payer: COMMERCIAL

## 2025-01-20 ENCOUNTER — TELEPHONE (OUTPATIENT)
Facility: CLINIC | Age: 55
End: 2025-01-20
Payer: COMMERCIAL

## 2025-01-20 DIAGNOSIS — K63.5 POLYP OF COLON, UNSPECIFIED PART OF COLON, UNSPECIFIED TYPE: Primary | ICD-10-CM

## 2025-01-20 NOTE — TELEPHONE ENCOUNTER
Per chart recall, pt due for repeat colonoscopy in march 2024 - can you place the order for the screening colonoscopy and send to [facility] .  Thank you.

## 2025-01-24 ENCOUNTER — TELEPHONE (OUTPATIENT)
Dept: RHEUMATOLOGY | Facility: CLINIC | Age: 55
End: 2025-01-24

## 2025-01-24 DIAGNOSIS — M05.771 RHEUMATOID ARTHRITIS INVOLVING BOTH ANKLES WITH POSITIVE RHEUMATOID FACTOR (MULTI): Primary | ICD-10-CM

## 2025-01-24 DIAGNOSIS — M05.772 RHEUMATOID ARTHRITIS INVOLVING BOTH ANKLES WITH POSITIVE RHEUMATOID FACTOR (MULTI): Primary | ICD-10-CM

## 2025-01-24 RX ORDER — ADALIMUMAB-ATTO 40 MG/.4ML
40 INJECTION SUBCUTANEOUS
Qty: 0.8 ML | Refills: 3 | Status: SHIPPED | OUTPATIENT
Start: 2025-01-24

## 2025-01-24 NOTE — TELEPHONE ENCOUNTER
Pt is requesting an alternative to a medication prescribed last year-adalimumab-adaz- as his healthcare coverage no longer covers it. Suggested alternative medication is Amjevita.  Pt can be reached at [phone].

## 2025-01-27 DIAGNOSIS — M05.771 RHEUMATOID ARTHRITIS INVOLVING BOTH ANKLES WITH POSITIVE RHEUMATOID FACTOR (MULTI): ICD-10-CM

## 2025-01-27 DIAGNOSIS — M05.772 RHEUMATOID ARTHRITIS INVOLVING BOTH ANKLES WITH POSITIVE RHEUMATOID FACTOR (MULTI): ICD-10-CM

## 2025-01-27 RX ORDER — ADALIMUMAB-ATTO 40 MG/.4ML
40 INJECTION SUBCUTANEOUS
Qty: 0.8 ML | Refills: 3 | Status: SHIPPED | OUTPATIENT
Start: 2025-01-27

## 2025-01-27 NOTE — PROGRESS NOTES
PA on file for Ronnie through 06/30/2025 case Id: [id number], patient needs to fill with [facility] please , rerouting rx

## 2025-02-04 NOTE — TELEPHONE ENCOUNTER
Pt is requesting to speak with care team regarding medication below-Amjevita. He can be reached to discuss further at [phone].